# Patient Record
Sex: MALE | URBAN - METROPOLITAN AREA
[De-identification: names, ages, dates, MRNs, and addresses within clinical notes are randomized per-mention and may not be internally consistent; named-entity substitution may affect disease eponyms.]

---

## 2020-11-06 ENCOUNTER — OFFICE VISIT (OUTPATIENT)
Dept: URBAN - METROPOLITAN AREA CLINIC 75 | Facility: CLINIC | Age: 25
End: 2020-11-06

## 2020-11-06 DIAGNOSIS — H11.151 PINGUECULA, RIGHT EYE: ICD-10-CM

## 2020-11-06 DIAGNOSIS — H11.132 CONJUNCTIVAL PIGMENTATIONS, LEFT EYE: Primary | ICD-10-CM

## 2020-11-06 PROCEDURE — 99203 OFFICE O/P NEW LOW 30 MIN: CPT | Performed by: OPTOMETRIST

## 2020-11-06 ASSESSMENT — INTRAOCULAR PRESSURE
OD: 20
OS: 22

## 2020-11-06 NOTE — IMPRESSION/PLAN
Impression: Conjunctival pigmentations, left eye: H11.132. Plan:  There is no evidence of permanent changes to the cornea. Educated Pt on importance of using artificial tears daily. Informed Pt that if tears do not help for PT to return to discuss use of Pred drops.